# Patient Record
(demographics unavailable — no encounter records)

---

## 2025-01-12 NOTE — ADDENDUM
[FreeTextEntry1] : Documented by Dior Godoy acting as a scribe for Dr. Jing Cotter. 01/07/2025   All medical record entries made by the scribe were at my, Dr. Jing Cotter, direction and personally dictated by me on 01/07/2025. I have reviewed the chart and agree that the record accurately reflects my personal performance of the history, physical exam, assessment and plan. I have also personally directed, reviewed, and agreed with the chart.

## 2025-01-12 NOTE — ASSESSMENT
[FreeTextEntry1] : Cough Benzonatate 200mg BID/TID prn Promethazine qhs prn increase po fluids  Pt to follow up in one week or sooner if symptoms persist or worsen.

## 2025-01-12 NOTE — HISTORY OF PRESENT ILLNESS
[Home] : at home, [unfilled] , at the time of the visit. [Medical Office: ([facility])___] : at the medical office located in  [Verbal consent obtained from patient] : the patient, [unfilled] [de-identified] : Ms. LAMONTE GREEN is a 41 year old female with Hx of high TG, who presents for an acute visit.   Pt c/o lingering cough since she started feeling sick a week ago. Went to urgent care last week for cough. fever. Tested negative for Covid and flu. CXR negative. Was Rx'ed Doxycycline. Finished it yesterday. Has been taking OTC Robitussin, Mucinex. Denies any SOB, CP, abdominal pain, N/V/D, headache, dizziness, or leg swelling.

## 2025-01-12 NOTE — HISTORY OF PRESENT ILLNESS
[Home] : at home, [unfilled] , at the time of the visit. [Medical Office: ([facility])___] : at the medical office located in  [Verbal consent obtained from patient] : the patient, [unfilled] [de-identified] : Ms. LAMONTE GREEN is a 41 year old female with Hx of high TG, who presents for an acute visit.   Pt c/o lingering cough since she started feeling sick a week ago. Went to urgent care last week for cough. fever. Tested negative for Covid and flu. CXR negative. Was Rx'ed Doxycycline. Finished it yesterday. Has been taking OTC Robitussin, Mucinex. Denies any SOB, CP, abdominal pain, N/V/D, headache, dizziness, or leg swelling.

## 2025-06-07 NOTE — ADDENDUM
[FreeTextEntry1] : Documented by Mick Ragsdale acting as a scribe for Dr. Jing Cotter. 06/04/2025  All medical records entries made by the scribe were at my, Dr. Cotter, direction and personally dictated by me on 06/04/2025. I have reviewed the chart and agree that the record accurately reflects my personal performance of the history, physical exam, assessment and plan. I have also personally directed, reviewed, and agreed with the chart.
[FreeTextEntry1] : Documented by Mick Ragsdale acting as a scribe for Dr. Jing Cotter. 06/04/2025  All medical records entries made by the scribe were at my, Dr. Cotter, direction and personally dictated by me on 06/04/2025. I have reviewed the chart and agree that the record accurately reflects my personal performance of the history, physical exam, assessment and plan. I have also personally directed, reviewed, and agreed with the chart.
3

## 2025-06-07 NOTE — PHYSICAL EXAM
[No Acute Distress] : no acute distress [Well Nourished] : well nourished [Well Developed] : well developed [Well-Appearing] : well-appearing [Normal Sclera/Conjunctiva] : normal sclera/conjunctiva [PERRL] : pupils equal round and reactive to light [EOMI] : extraocular movements intact [Normal Outer Ear/Nose] : the outer ears and nose were normal in appearance [Normal Oropharynx] : the oropharynx was normal [No JVD] : no jugular venous distention [No Lymphadenopathy] : no lymphadenopathy [Supple] : supple [Thyroid Normal, No Nodules] : the thyroid was normal and there were no nodules present [No Respiratory Distress] : no respiratory distress  [No Accessory Muscle Use] : no accessory muscle use [Clear to Auscultation] : lungs were clear to auscultation bilaterally [Normal Rate] : normal rate  [Regular Rhythm] : with a regular rhythm [Normal S1, S2] : normal S1 and S2 [No Murmur] : no murmur heard [Pedal Pulses Present] : the pedal pulses are present [No Edema] : there was no peripheral edema [No Extremity Clubbing/Cyanosis] : no extremity clubbing/cyanosis [Soft] : abdomen soft [Non Tender] : non-tender [Non-distended] : non-distended [No Masses] : no abdominal mass palpated [No HSM] : no HSM [Normal Bowel Sounds] : normal bowel sounds [Normal Posterior Cervical Nodes] : no posterior cervical lymphadenopathy [Normal Anterior Cervical Nodes] : no anterior cervical lymphadenopathy [No CVA Tenderness] : no CVA  tenderness [No Spinal Tenderness] : no spinal tenderness [No Joint Swelling] : no joint swelling [Grossly Normal Strength/Tone] : grossly normal strength/tone [No Rash] : no rash [Coordination Grossly Intact] : coordination grossly intact [No Focal Deficits] : no focal deficits [Normal Gait] : normal gait [Normal Affect] : the affect was normal [Normal Insight/Judgement] : insight and judgment were intact [de-identified] : left knee pain

## 2025-06-07 NOTE — ASSESSMENT
[Vaccines Reviewed] : Immunizations reviewed today. Please see immunization details in the vaccine log within the immunization flowsheet.  [FreeTextEntry1] : annual  referred for mammogram referred to GYN for PAP  left knee pain referred to ortho  elevated TG reinforced the importance of following a low cholesterol/low fat diet we'll check lipids and LFT's today   bloodwork ordered follow up in one week for lab results

## 2025-06-07 NOTE — HISTORY OF PRESENT ILLNESS
[de-identified] : Ms. LAMONTE GREEN is a 42 year old female with hx of high TG, presenting for an annual physical.   Pt c/o left knee pain. Denies any SOB, CP, abdominal pain, N/V/D, headache, dizziness, or leg swelling.

## 2025-06-07 NOTE — HEALTH RISK ASSESSMENT
[Fair] :  ~his/her~ mood as fair [No] : No [Little interest or pleasure doing things] : 1) Little interest or pleasure doing things [Feeling down, depressed, or hopeless] : 2) Feeling down, depressed, or hopeless [0] : 2) Feeling down, depressed, or hopeless: Not at all (0) [PHQ-2 Negative - No further assessment needed] : PHQ-2 Negative - No further assessment needed [Never] : Never [Patient reported mammogram was normal] : Patient reported mammogram was normal [Patient reported PAP Smear was normal] : Patient reported PAP Smear was normal [With Family] : lives with family [# of Members in Household ___] :  household currently consist of [unfilled] member(s) [Employed] : employed [College] : College [Single] : single [# Of Children ___] : has [unfilled] children [Feels Safe at Home] : Feels safe at home [de-identified] : Admits she is not very active. [de-identified] : Maintains a healthy diet.  [[id number]] : 0 [MammogramDate] : 06/2024 [PapSmearDate] : 06/2024

## 2025-06-07 NOTE — HEALTH RISK ASSESSMENT
[Fair] :  ~his/her~ mood as fair [No] : No [Little interest or pleasure doing things] : 1) Little interest or pleasure doing things [Feeling down, depressed, or hopeless] : 2) Feeling down, depressed, or hopeless [0] : 2) Feeling down, depressed, or hopeless: Not at all (0) [PHQ-2 Negative - No further assessment needed] : PHQ-2 Negative - No further assessment needed [Never] : Never [Patient reported mammogram was normal] : Patient reported mammogram was normal [Patient reported PAP Smear was normal] : Patient reported PAP Smear was normal [With Family] : lives with family [# of Members in Household ___] :  household currently consist of [unfilled] member(s) [Employed] : employed [College] : College [Single] : single [# Of Children ___] : has [unfilled] children [Feels Safe at Home] : Feels safe at home [de-identified] : Admits she is not very active. [de-identified] : Maintains a healthy diet.  [[id number]] : 0 [MammogramDate] : 06/2024 [PapSmearDate] : 06/2024

## 2025-06-07 NOTE — HISTORY OF PRESENT ILLNESS
[de-identified] : Ms. LAMONTE GREEN is a 42 year old female with hx of high TG, presenting for an annual physical.   Pt c/o left knee pain. Denies any SOB, CP, abdominal pain, N/V/D, headache, dizziness, or leg swelling.

## 2025-06-07 NOTE — PHYSICAL EXAM
[No Acute Distress] : no acute distress [Well Nourished] : well nourished [Well Developed] : well developed [Well-Appearing] : well-appearing [Normal Sclera/Conjunctiva] : normal sclera/conjunctiva [PERRL] : pupils equal round and reactive to light [EOMI] : extraocular movements intact [Normal Outer Ear/Nose] : the outer ears and nose were normal in appearance [Normal Oropharynx] : the oropharynx was normal [No JVD] : no jugular venous distention [No Lymphadenopathy] : no lymphadenopathy [Supple] : supple [Thyroid Normal, No Nodules] : the thyroid was normal and there were no nodules present [No Respiratory Distress] : no respiratory distress  [No Accessory Muscle Use] : no accessory muscle use [Clear to Auscultation] : lungs were clear to auscultation bilaterally [Normal Rate] : normal rate  [Regular Rhythm] : with a regular rhythm [Normal S1, S2] : normal S1 and S2 [No Murmur] : no murmur heard [Pedal Pulses Present] : the pedal pulses are present [No Edema] : there was no peripheral edema [No Extremity Clubbing/Cyanosis] : no extremity clubbing/cyanosis [Soft] : abdomen soft [Non Tender] : non-tender [Non-distended] : non-distended [No Masses] : no abdominal mass palpated [No HSM] : no HSM [Normal Bowel Sounds] : normal bowel sounds [Normal Posterior Cervical Nodes] : no posterior cervical lymphadenopathy [Normal Anterior Cervical Nodes] : no anterior cervical lymphadenopathy [No CVA Tenderness] : no CVA  tenderness [No Spinal Tenderness] : no spinal tenderness [No Joint Swelling] : no joint swelling [Grossly Normal Strength/Tone] : grossly normal strength/tone [No Rash] : no rash [Coordination Grossly Intact] : coordination grossly intact [No Focal Deficits] : no focal deficits [Normal Gait] : normal gait [Normal Affect] : the affect was normal [Normal Insight/Judgement] : insight and judgment were intact [de-identified] : left knee pain

## 2025-06-20 NOTE — HISTORY OF PRESENT ILLNESS
[de-identified] : Rosy Moore is a 42-year-old female who presents to the office for evaluation of her left knee pain.  Patient has been experiencing left knee pain for the last few years.  Pain is located over the anterior knee.  Pain is worse with stairs, changes in weather, kneeling, and squatting.  She has tried Aleve.  She has not tried physical therapy.  History: None

## 2025-06-20 NOTE — PHYSICAL EXAM
[de-identified] : Constitutional:  42 year old female, alert and oriented, cooperative, in no acute distress.  HEENT  NC/AT.  Appearance: symmetric  Neck/Back Straight without deformity or instability.  Good ROM.  Chest/Respiratory  Respiratory effort: no intercostal retractions or use of accessory muscles. Nonlabored Breathing  Skin  On inspection, warm and dry without rashes or lesions.  Mental Status:  Judgment, insight: intact Orientation: oriented to time, place, and person  Neurological: Sensory and Motor are grossly intact throughout  Left Knee  Inspection:     Skin intact, no rashes or lesions     No Effusion     Non-tender to palpation over tibial tubercle, medial and lateral joint line, and pes insertion.      Tenderness over the patella  Range of Motion: 	Extension - 0 degrees 	Flexion - 120 degrees 	Extensor lag: None  Stability:      Demonstrates no Varus or Valgus instability      Negative Anterior or Posterior drawer.      Negative Lachman's  Patella: stable, tracks well.   Neurologic Exam     Motor intact including 5/5 Extensor Hallucis Longus, 5/5 Flexor Hallucis Longus, 5/5 Tibialis Anterior and 5/5 Gastrocnemius     Sensation Intact to Light Touch including Saphenous, Sural, Superficial Peroneal, Deep Peroneal, Tibial nerve distributions  Vascular Exam     Foot is warm and well perfused with 2+ Dorsalis Pedis Pulse   No pain with range of motion of the bilateral hips or right knee. No lumbar paraspinal muscle tenderness.  [de-identified] : XRay:  XRays of the Left Knee (4 Views) taken in the office today and discussed with the patient. XRays demonstrate no obvious fracture or dislocation. There is no significant evidence of osteoarthritis or osteophyte formation. (my personal interpretation).

## 2025-06-20 NOTE — DISCUSSION/SUMMARY
[de-identified] : Rosy Moore is a 42-year-old female presents to the office for evaluation of her left knee pain.  X-rays showed no obvious fractures or dislocations.  Examination showed good left knee range of motion.  There is tenderness over the anterior knee. Discussed with the patient the examination and imaging findings.  Discussed with the patient the management of patient's patellofemoral pain at this time, including physical therapy and anti-inflammatories.  Patient was given referral for physical therapy.  Patient will take over-the-counter anti-inflammatories as needed for pain control.  Patient will follow-up in 3 months for reevaluation and management.  Patient understanding and in agreement the plan.  All questions answered   Plan: -Physical Therapy -Over-the-counter anti-inflammatories as needed for pain control -Follow up in 3 months for reevaluation and management

## 2025-06-24 NOTE — HISTORY OF PRESENT ILLNESS
[FreeTextEntry1] : 42 year F presents for annual visit. Requests STI screening   LMP: 2025 Menses: 13yo, q28d, 6d SA/Contraception: yes, men, none   OBHx:   - PTD, PPROM, Male, 28wks GYNHx: Hysteroscopic myomectomy    Health Maintenance: Last Pap - 2024 Mammo - 2024